# Patient Record
(demographics unavailable — no encounter records)

---

## 2024-11-07 NOTE — PHYSICAL EXAM
[de-identified] : well healed scar [de-identified] : no palpable thyroid nodules [Laryngoscopy Performed] : laryngoscopy was performed, see procedure section for findings [Midline] : located in midline position [Normal] : orientation to person, place, and time: normal

## 2024-11-07 NOTE — HISTORY OF PRESENT ILLNESS
[de-identified] : 1 1/2 years s/p total thyroidectomy for malignancy. feels well on synthroid 88 mcg daily. denies dysphagia, hoarseness.  recently treated for kidney stones.  I have reviewed all old and new data and available images.  Additional information was obtained from others present at the time of visit to ensure the completeness of the history.  last sonogram shows small thyroid bed nodule

## 2025-05-08 NOTE — HISTORY OF PRESENT ILLNESS
[de-identified] : 2 years s/p total thyroidectomy for malignancy. denies dysphagia, hoarseness or new lesions. treatment of kidney stone and removal stent since last visit. never had f/u bloods after synthroid was increased to 100 mcg 6 months ago.  recent sonogram shows stable small thyroid bed nodule. I have reviewed all old and new data and available images.  Additional information was obtained from others present at the time of visit to ensure the completeness of the history

## 2025-05-08 NOTE — ASSESSMENT
[FreeTextEntry1] : will observe. bloods drawn. to call next week for results. sonogram next year. to return earlier if any change. patient has been given the opportunity to ask questions, and all of the patient's questions have been answered to their satisfaction

## 2025-05-08 NOTE — PHYSICAL EXAM
[de-identified] : well healed scar [de-identified] : no palpable thyroid nodules [Laryngoscopy Performed] : laryngoscopy was performed, see procedure section for findings [Midline] : located in midline position [Normal] : orientation to person, place, and time: normal [de-identified] : negative chvostek's sign